# Patient Record
Sex: FEMALE | Race: BLACK OR AFRICAN AMERICAN | URBAN - METROPOLITAN AREA
[De-identification: names, ages, dates, MRNs, and addresses within clinical notes are randomized per-mention and may not be internally consistent; named-entity substitution may affect disease eponyms.]

---

## 2017-03-15 DIAGNOSIS — J45.40 MODERATE PERSISTENT ASTHMA WITHOUT COMPLICATION: ICD-10-CM

## 2017-03-15 RX ORDER — MONTELUKAST SODIUM 10 MG/1
TABLET ORAL
Qty: 90 TABLET | Refills: 0 | OUTPATIENT
Start: 2017-03-15

## 2017-04-14 DIAGNOSIS — J45.40 MODERATE PERSISTENT ASTHMA WITHOUT COMPLICATION: ICD-10-CM

## 2017-04-14 NOTE — TELEPHONE ENCOUNTER
tiotropium (SPIRIVA RESPIMAT) 2.5 MCG/ACT inhalation aerosol       Last Written Prescription Date: 11-  Last Fill Quantity: 4 g., # refills: 3    Last Office Visit with G, P or [facility] prescribing provider:  11-   Future Office Visit:       Date of Last Asthma Action Plan Letter:   Asthma Action Plan Q1 Year    Topic Date Due     Asthma Action Plan - yearly  09/15/2017      Asthma Control Test:   ACT Total Scores 11/22/2016   ACT TOTAL SCORE (Goal Greater than or Equal to 20) 14   In the past 12 months, how many times did you visit the emergency room for your asthma without being admitted to the hospital? 0   In the past 12 months, how many times were you hospitalized overnight because of your asthma? 0       Date of Last Spirometry Test:   No results found for this or any previous visit.

## 2017-04-14 NOTE — TELEPHONE ENCOUNTER
Routing refill request to provider for review/approval because:  ACT < [facility] protocol for RN fill  Carlie Perez RN  [facility] [employer]

## 2017-05-01 ENCOUNTER — TELEPHONE (OUTPATIENT)
Dept: FAMILY MEDICINE | Facility: CLINIC | Age: 42
End: 2017-05-01

## 2017-05-01 DIAGNOSIS — M10.00 IDIOPATHIC GOUT, UNSPECIFIED CHRONICITY, UNSPECIFIED SITE: Primary | ICD-10-CM

## 2017-05-01 NOTE — TELEPHONE ENCOUNTER
Pt insurance plan no longer covers Indomethacin. Would you like to proceed with PA or order something else?    Ph: [phone]  Pt id: [id number]    Sarah Severson, CMA

## 2017-05-02 ENCOUNTER — OFFICE VISIT (OUTPATIENT)
Dept: FAMILY MEDICINE | Facility: CLINIC | Age: 42
End: 2017-05-02
Payer: COMMERCIAL

## 2017-05-02 VITALS
DIASTOLIC BLOOD PRESSURE: 86 MMHG | BODY MASS INDEX: 50.86 KG/M2 | OXYGEN SATURATION: 98 % | SYSTOLIC BLOOD PRESSURE: 130 MMHG | WEIGHT: 269.4 LBS | TEMPERATURE: 98.2 F | HEART RATE: 110 BPM | HEIGHT: 61 IN

## 2017-05-02 DIAGNOSIS — J45.20 MILD INTERMITTENT ASTHMA WITHOUT COMPLICATION: ICD-10-CM

## 2017-05-02 DIAGNOSIS — M25.511 ACUTE PAIN OF RIGHT SHOULDER: Primary | ICD-10-CM

## 2017-05-02 PROCEDURE — 99214 OFFICE O/P EST MOD 30 MIN: CPT | Performed by: FAMILY MEDICINE

## 2017-05-02 RX ORDER — CYCLOBENZAPRINE HCL 5 MG
5 TABLET ORAL
Qty: 20 TABLET | Refills: 0 | Status: SHIPPED | OUTPATIENT
Start: 2017-05-02

## 2017-05-02 NOTE — TELEPHONE ENCOUNTER
Initiated a PA for Indomethacin. We should hear a response within 24-72 hours.  Lindy Kovacs, CMA

## 2017-05-02 NOTE — MR AVS SNAPSHOT
"              After Visit Summary   5/2/2017    Willow Gerard    MRN: [id number]           Patient Information     Date Of Birth          1975        Visit Information        Provider Department      5/2/2017 6:20 PM Natasha Medina MD [facility] [place]        Today's Diagnoses     Acute pain of right shoulder    -  1       Follow-ups after your visit        Follow-up notes from your care team     Return in about 7 months (around 11/20/2017) for Physical Exam.      Who to contact     If you have questions or need follow up information about today's clinic visit or your schedule please contact [facility] [place] directly at [phone].  Normal or non-critical lab and imaging results will be communicated to you by MyChart, letter or phone within 4 business days after the clinic has received the results. If you do not hear from us within 7 days, please contact the clinic through [employer] or phone. If you have a critical or abnormal lab result, we will notify you by phone as soon as possible.  Submit refill requests through [employer] or call your pharmacy and they will forward the refill request to us. Please allow 3 business days for your refill to be completed.          Additional Information About Your Visit        MyChart Information     [employer] gives you secure access to your electronic health record. If you see a primary care provider, you can also send messages to your care team and make appointments. If you have questions, please call your primary care clinic.  If you do not have a primary care provider, please call [phone] and they will assist you.        Care EveryWhere ID     This is your Care EveryWhere ID. This could be used by other organizations to access your [facility] medical records  [id number]        Your Vitals Were     Pulse Temperature Height Pulse Oximetry BMI (Body Mass Index)       110 98.2  F (36.8  C) (Tympanic) 5' 1\" (1.549 m) 98% 50.9 kg/m2        Blood " Pressure from Last 3 Encounters:   05/02/17 130/86   11/22/16 130/86   09/15/16 138/70    Weight from Last 3 Encounters:   05/02/17 269 lb 6.4 oz (122.2 kg)   11/22/16 267 lb (121.1 kg)   09/15/16 268 lb (121.6 kg)              Today, you had the following     No orders found for display         Today's Medication Changes          These changes are accurate as of: 5/2/17  6:43 PM.  If you have any questions, ask your nurse or doctor.               Start taking these medicines.        Dose/Directions    cyclobenzaprine 5 MG tablet   Commonly known as:  FLEXERIL   Used for:  Acute pain of right shoulder   Started by:  Natasha Medina MD        Dose:  5 mg   Take 1 tablet (5 mg) by mouth nightly as needed for muscle spasms   Quantity:  20 tablet   Refills:  0            Where to get your medicines      These medications were sent to [facility] [place] [address], [place] [address]     Phone:  [phone]     cyclobenzaprine 5 MG tablet                Primary Care Provider Office Phone # Fax #    Natasha Medina [phone]       [address]  [place] [address]        Thank you!     Thank you for [place] [facility]  [place] your [place]. Our goal is always to provide you with excellent care. Hearing back from our patients is one way we can continue to improve our services. Please take a few minutes to complete the written survey that you may receive in the mail after your visit with us. Thank you!             Your Updated Medication List - Protect others around you: Learn how to safely use, store and throw away your medicines at www.disposemymeds.org.          This list is accurate as of: 5/2/17  6:43 PM.  Always use your most recent med list.                   Brand Name Dispense Instructions for use    albuterol 108 (90 BASE) MCG/ACT Inhaler    albuterol    2 Inhaler     Inhale 2 puffs into the lungs every 4 hours as needed for shortness of breath / dyspnea or wheezing       Colchicine 0.6 MG Caps     60 capsule    Take 0.6 mg by mouth 2 times daily       cyclobenzaprine 5 MG tablet    FLEXERIL    20 tablet    Take 1 tablet (5 mg) by mouth nightly as needed for muscle spasms       ferrous sulfate 325 (65 FE) MG tablet    IRON    180 tablet    Take 1 tablet (325 mg) by mouth 2 times daily       fluticasone 50 MCG/ACT spray    FLONASE    16 g    Spray 2 sprays into both nostrils daily       * gabapentin 300 MG capsule    NEURONTIN    270 capsule    Take 1 capsule (300 mg) by mouth 3 times daily       * gabapentin 300 MG capsule    NEURONTIN    270 capsule    TAKE 1 CAPSULE(300 MG) BY MOUTH THREE TIMES DAILY       indomethacin 25 MG capsule    INDOCIN    60 capsule    TAKE 2 CAPSULES(50 MG) BY MOUTH TWICE DAILY AS NEEDED       lisinopril-hydrochlorothiazide 10-12.5 MG per tablet    PRINZIDE/ZESTORETIC    90 tablet    Take 1 tablet by mouth daily       mometasone-formoterol 200-5 MCG/ACT oral inhaler    DULERA    13 g    INHALE 2 PUFFS BY MOUTH TWICE DAILY       montelukast 10 MG tablet    SINGULAIR    90 tablet    Take 1 tablet (10 mg) by mouth At Bedtime       tiotropium 2.5 MCG/ACT inhalation aerosol    SPIRIVA RESPIMAT    4 g    Inhale 2 puffs into the lungs daily       * Notice:  This list has 2 medication(s) that are the same as other medications prescribed for you. Read the directions carefully, and ask your doctor or other care provider to review them with you.

## 2017-05-02 NOTE — PROGRESS NOTES
SUBJECTIVE:                                                    Willow Gerard is a 41 year old female who presents to clinic today for the following health issues:      Joint Pain     Onset: Friday    Description:   Location: right shoulder  Character: Dull ache and Stabbing    Intensity: mild, moderate    Progression of Symptoms: worse    Accompanying Signs & Symptoms:  Other symptoms: none   History:   Previous similar pain: no       Precipitating factors:   Trauma or overuse: no     Alleviating factors:  Improved by: heat       Therapies Tried and outcome: heat    Concern - disability parking form     ACT Total Scores 2017   ACT TOTAL SCORE (Goal Greater than or Equal to 20) 20   In the past 12 months, how many times did you visit the emergency room for your asthma without being admitted to the hospital? 0   In the past 12 months, how many times were you hospitalized overnight because of your asthma? 0         Problem list and histories reviewed & adjusted, as indicated.  Additional history: as documented    Patient Active Problem List   Diagnosis     SAM (obstructive sleep apnea)     Gout     Double vision     Hypertension     Asthma     Allergic rhinitis     Bursitis, trochanteric     Hydrocephalus     Menorrhagia     HTN, goal below 140/90     Encephalopathy     Past Surgical History:   Procedure Laterality Date     HERNIA REPAIR        IMPLANT SHUNT VENTRICULOPERITONEAL      prematurity        Social History   Substance Use Topics     Smoking status: Never Smoker     Smokeless tobacco: Never Used     Alcohol use No     Family History   Problem Relation Age of Onset     Hypertension       DIABETES       CEREBROVASCULAR DISEASE       Breast Cancer Paternal Grandfather      Arthritis       RA     CANCER Maternal Grandfather      stomach     CANCER Father      throat     KIDNEY DISEASE Sister      on dialysis         Current Outpatient Prescriptions   Medication Sig Dispense Refill      "cyclobenzaprine (FLEXERIL) 5 MG tablet Take 1 tablet (5 mg) by mouth nightly as needed for muscle spasms 20 tablet 0     tiotropium (SPIRIVA RESPIMAT) 2.5 MCG/ACT inhalation aerosol Inhale 2 puffs into the lungs daily 4 g 3     gabapentin (NEURONTIN) 300 MG capsule TAKE 1 CAPSULE(300 MG) BY MOUTH THREE TIMES DAILY 270 capsule 1     Colchicine 0.6 MG CAPS Take 0.6 mg by mouth 2 times daily 60 capsule 3     ferrous sulfate (IRON) 325 (65 FE) MG tablet Take 1 tablet (325 mg) by mouth 2 times daily 180 tablet 3     fluticasone (FLONASE) 50 MCG/ACT nasal spray Spray 2 sprays into both nostrils daily 16 g 11     montelukast (SINGULAIR) 10 MG tablet Take 1 tablet (10 mg) by mouth At Bedtime 90 tablet 3     albuterol (ALBUTEROL) 108 (90 BASE) MCG/ACT inhaler Inhale 2 puffs into the lungs every 4 hours as needed for shortness of breath / dyspnea or wheezing 2 Inhaler 6     gabapentin (NEURONTIN) 300 MG capsule Take 1 capsule (300 mg) by mouth 3 times daily 270 capsule 3     indomethacin (INDOCIN) 25 MG capsule TAKE 2 CAPSULES(50 MG) BY MOUTH TWICE DAILY AS NEEDED 60 capsule 7     lisinopril-hydrochlorothiazide (PRINZIDE,ZESTORETIC) 10-12.5 MG per tablet Take 1 tablet by mouth daily 90 tablet 3     mometasone-formoterol (DULERA) 200-5 MCG/ACT oral inhaler INHALE 2 PUFFS BY MOUTH TWICE DAILY 13 g 11     No Known Allergies    Reviewed and updated as needed this visit by clinical staff       Reviewed and updated as needed this visit by Provider         ROS:  C: NEGATIVE for fever, chills, change in weight  E/M: NEGATIVE for ear, mouth and throat problems  R: NEGATIVE for significant cough or SOB  CV: NEGATIVE for chest pain, palpitations or peripheral edema    OBJECTIVE:                                                    /86 (BP Location: Right arm, Cuff Size: Adult Large)  Pulse 110  Temp 98.2  F (36.8  C) (Tympanic)  Ht 5' 1\" (1.549 m)  Wt 269 lb 6.4 oz (122.2 kg)  SpO2 98%  BMI 50.9 kg/m2  Body mass index is 50.9 " kg/(m^2).   GENERAL: healthy, alert, well nourished, well hydrated, no distress  NECK: no tenderness, no adenopathy, no asymmetry, no masses, no stiffness; thyroid- normal to palpation  RESP: lungs clear to auscultation - no rales, no rhonchi, no wheezes  CV: regular rates and rhythm, normal S1 S2, no S3 or S4 and no murmur, no click or rub -  Right shoulder: tenderness noted posteriorly in the right shoulder area. No bony tenderness noted.   No swelling or erythema noted. ROM is limited due to pain       ASSESSMENT/PLAN:                                                      1. Acute pain of right shoulder  Recommended trial of muscle relaxer. Side effect profile discussed. Use ibuprofen prn pain during the day. If not improving or getting worse, recommended to notify me back.   - cyclobenzaprine (FLEXERIL) 5 MG tablet; Take 1 tablet (5 mg) by mouth nightly as needed for muscle spasms  Dispense: 20 tablet; Refill: 0    2. Mild intermittent asthma without complication  Well controlled.       Handicap parking permit form filled and given to the patient.   Follow up with Provider - as needed     Total time spent was 25 minutes, more than half the time was spent in counseling the patient about the disease pathogenesis, treatment plan and coordinating care.    Natasha Medina MD  [place]

## 2017-05-03 ASSESSMENT — ASTHMA QUESTIONNAIRES: ACT_TOTALSCORE: 20

## 2017-05-08 RX ORDER — NAPROXEN 500 MG/1
500 TABLET ORAL 2 TIMES DAILY PRN
Qty: 60 TABLET | Refills: 1 | Status: SHIPPED | OUTPATIENT
Start: 2017-05-08 | End: 2017-07-11

## 2017-05-08 NOTE — TELEPHONE ENCOUNTER
Spoke with patients niece (consent on file) and informed of below.   Francie Lockett RN   [facility] - Triage

## 2017-05-08 NOTE — TELEPHONE ENCOUNTER
PA was denied. This would be covered if she t/f Naproxen, Ibuprofen or Sulindac. I know she's on Gabapentin, so more than likely has tried Naproxen and ibuprofen but from a clinical standpoint it was not in her current or historical med list so could not prove it.   Lindy Kovacs, CMA

## 2017-05-08 NOTE — TELEPHONE ENCOUNTER
Indomethacin canceled. Try Naproxen instead. Ordered to pharmacy. Notify patient to use it for gout prn.     Natasha Medina MD  [facility], [place]

## 2017-05-11 DIAGNOSIS — G89.29 CHRONIC BILATERAL LOW BACK PAIN WITHOUT SCIATICA: ICD-10-CM

## 2017-05-11 DIAGNOSIS — M54.50 CHRONIC BILATERAL LOW BACK PAIN WITHOUT SCIATICA: ICD-10-CM

## 2017-05-11 RX ORDER — GABAPENTIN 300 MG/1
CAPSULE ORAL
Qty: 270 CAPSULE | Refills: 1 | Status: SHIPPED | OUTPATIENT
Start: 2017-05-11 | End: 2017-11-08

## 2017-05-11 NOTE — TELEPHONE ENCOUNTER
gabapentin (NEURONTIN) 300 MG capsule      Last Written Prescription Date:  12/13/2016  Last Fill Quantity: 270,   # refills: 1  Last Office Visit with [facility], [facility] or  Health prescribing provider: 5/2/2017  Future Office visit:       Routing refill request to provider for review/approval because:  Drug not on the [facility], [facility] or [facility] refill protocol or controlled substance

## 2017-07-11 DIAGNOSIS — M10.00 IDIOPATHIC GOUT, UNSPECIFIED CHRONICITY, UNSPECIFIED SITE: ICD-10-CM

## 2017-07-11 NOTE — TELEPHONE ENCOUNTER
naproxen (NAPROSYN) 500 MG      Last Written Prescription Date: 5/8/17  Last Quantity: 60, # refills: 1  Last Office Visit with G, P or [facility] prescribing provider: 5/2/17       Creatinine   Date Value Ref Range Status   03/17/2016 0.60 0.52 - 1.04 mg/dL Final     Lab Results   Component Value Date    AST 9 03/17/2016     Lab Results   Component Value Date    ALT 23 03/17/2016     BP Readings from Last 3 Encounters:   05/02/17 130/86   11/22/16 130/86   09/15/16 138/70

## 2017-07-11 NOTE — TELEPHONE ENCOUNTER
Routing refill request to provider for review/approval because:  Labs not current:  Creatinine, ALT/AST annual needed.  Nohelia Nye RN

## 2017-07-12 RX ORDER — NAPROXEN 500 MG/1
500 TABLET ORAL 2 TIMES DAILY PRN
Qty: 60 TABLET | Refills: 1 | Status: SHIPPED | OUTPATIENT
Start: 2017-07-12 | End: 2017-09-11

## 2017-07-24 DIAGNOSIS — I10 HTN, GOAL BELOW 140/90: ICD-10-CM

## 2017-07-24 NOTE — TELEPHONE ENCOUNTER
Prinzide       Last Written Prescription Date: 9/15/16  Last Fill Quantity: 90, # refills: 3  Last Office Visit with [facility], [facility] or [facility] prescribing provider: 5/2/17       Potassium   Date Value Ref Range Status   03/17/2016 3.6 3.4 - 5.3 mmol/L Final     Creatinine   Date Value Ref Range Status   03/17/2016 0.60 0.52 - 1.04 mg/dL Final     BP Readings from Last 3 Encounters:   05/02/17 130/86   11/22/16 130/86   09/15/16 138/70

## 2017-07-25 RX ORDER — LISINOPRIL/HYDROCHLOROTHIAZIDE 10-12.5 MG
TABLET ORAL
Qty: 90 TABLET | Refills: 0 | OUTPATIENT
Start: 2017-07-25

## 2017-08-07 DIAGNOSIS — J45.40 MODERATE PERSISTENT ASTHMA WITHOUT COMPLICATION: ICD-10-CM

## 2017-08-07 NOTE — TELEPHONE ENCOUNTER
tiotropium (SPIRIVA RESPIMAT) 2.5 MCG/ACT inhalation aerosol      Last Written Prescription Date: 4-  Last Fill Quantity: 4 g., # refills: 3    Last Office Visit with G, P or [facility] prescribing provider:  5-2-2017   Future Office Visit:       Date of Last Asthma Action Plan Letter:   Asthma Action Plan Q1 Year    Topic Date Due     Asthma Action Plan - yearly  09/15/2017      Asthma Control Test:   ACT Total Scores 5/2/2017   ACT TOTAL SCORE -   ASTHMA ER VISITS -   ASTHMA HOSPITALIZATIONS -   ACT TOTAL SCORE (Goal Greater than or Equal to 20) 20   In the past 12 months, how many times did you visit the emergency room for your asthma without being admitted to the hospital? 0   In the past 12 months, how many times were you hospitalized overnight because of your asthma? 0       Date of Last Spirometry Test:   No results found for this or any previous visit.

## 2017-08-07 NOTE — TELEPHONE ENCOUNTER
appt and ACT up to date and at goal.  Refill request approved per [facility] protocol    Angie Gates RN

## 2017-09-06 DIAGNOSIS — J30.9 ALLERGIC RHINITIS: Primary | ICD-10-CM

## 2017-09-06 RX ORDER — FLUTICASONE PROPIONATE 50 MCG
2 SPRAY, SUSPENSION (ML) NASAL DAILY
Qty: 16 G | Refills: 4 | Status: SHIPPED | OUTPATIENT
Start: 2017-09-06

## 2017-09-06 NOTE — TELEPHONE ENCOUNTER
fluticasone (FLONASE) 50 MCG/ACT      Last Written Prescription Date: 9/15/16  Last Fill Quantity: 16g,  # refills: 11   Last Office Visit with [facility], [facility] or [facility] prescribing provider: 5/2/17

## 2017-09-06 NOTE — TELEPHONE ENCOUNTER
Prescription approved per [facility], [facility] or [facility] refill protocol.  Carlie Perez RN - Triage  [facility]

## 2017-09-11 DIAGNOSIS — D50.8 OTHER IRON DEFICIENCY ANEMIA: ICD-10-CM

## 2017-09-11 DIAGNOSIS — M10.00 IDIOPATHIC GOUT, UNSPECIFIED CHRONICITY, UNSPECIFIED SITE: ICD-10-CM

## 2017-09-11 NOTE — TELEPHONE ENCOUNTER
naproxen (NAPROSYN) 500 MG tablet      Last Written Prescription Date: 7-  Last Quantity: 60, # refills: 1  Last Office Visit with [facility], [facility] or  [facility] prescribing provider: 5-2-2017       Creatinine   Date Value Ref Range Status   03/17/2016 0.60 0.52 - 1.04 mg/dL Final     Lab Results   Component Value Date    AST 9 03/17/2016     Lab Results   Component Value Date    ALT 23 03/17/2016     BP Readings from Last 3 Encounters:   05/02/17 130/86   11/22/16 130/86   09/15/16 138/70   Last Office Visit with [facility], [facility] or [employer] prescribing provider: 9-                                             Last Written Prescription Date: 180  Last Fill Quantity: 180,  # refills: 3   Last Office Visit with [facility], [facility] or [employer] prescribing provider: 5-2-2017

## 2017-09-13 RX ORDER — FERROUS SULFATE 325(65) MG
325 TABLET ORAL 2 TIMES DAILY
Qty: 180 TABLET | Refills: 3 | Status: SHIPPED | OUTPATIENT
Start: 2017-09-13

## 2017-09-13 RX ORDER — NAPROXEN 500 MG/1
500 TABLET ORAL 2 TIMES DAILY PRN
Qty: 60 TABLET | Refills: 1 | Status: SHIPPED | OUTPATIENT
Start: 2017-09-13 | End: 2017-11-09

## 2017-09-13 NOTE — TELEPHONE ENCOUNTER
Ferrous sulfate 325        Last Written Prescription Date: 9/15/16  Last Fill Quantity: 180,    # refills: 3  Last Office Visit with [facility], [facility] or [facility] prescribing provider:  5/2/17      Lab Results   Component Value Date    WBC 5.8 03/17/2016     Lab Results   Component Value Date    RBC 4.79 03/17/2016     Lab Results   Component Value Date    HGB 12.9 03/17/2016     Lab Results   Component Value Date    HCT 40.3 03/17/2016     No components found for: MCT  Lab Results   Component Value Date    MCV 84 03/17/2016     Lab Results   Component Value Date    MCH 26.9 03/17/2016     Lab Results   Component Value Date    MCHC 32.0 03/17/2016     Lab Results   Component Value Date    RDW 15.0 03/17/2016     Lab Results   Component Value Date     03/17/2016     Lab Results   Component Value Date    AST 9 03/17/2016     Lab Results   Component Value Date    ALT 23 03/17/2016     Creatinine   Date Value Ref Range Status   03/17/2016 0.60 0.52 - 1.04 mg/dL Final     Routing refill request to provider for review/approval because:  Labs not current:  Over a year since related labs.  Patient is due for asthma follow up in November.  Nohelia Nye RN

## 2017-09-20 DIAGNOSIS — I10 ESSENTIAL HYPERTENSION WITH GOAL BLOOD PRESSURE LESS THAN 140/90: ICD-10-CM

## 2017-09-20 RX ORDER — LISINOPRIL/HYDROCHLOROTHIAZIDE 10-12.5 MG
1 TABLET ORAL DAILY
Qty: 90 TABLET | Refills: 0 | Status: SHIPPED | OUTPATIENT
Start: 2017-09-20 | End: 2018-03-06

## 2017-09-20 NOTE — TELEPHONE ENCOUNTER
Please have the patient come in for annual exam, labs and recheck.   RF ordered    Natasha Medina MD  [facility], [place]

## 2017-09-20 NOTE — TELEPHONE ENCOUNTER
Routing refill request to provider for review/approval because:  Labs not current:  Potassium, creatinine  Carlie Perez RN - Triage  [facility]

## 2017-09-20 NOTE — TELEPHONE ENCOUNTER
lisinopril-hydrochlorothiazide (PRINZIDE,ZESTORETIC) 10-12.5 MG      Last Written Prescription Date: 9/15/16  Last Fill Quantity: 90, # refills: 3  Last Office Visit with G, P or [facility] prescribing provider: 5/2/17       Potassium   Date Value Ref Range Status   03/17/2016 3.6 3.4 - 5.3 mmol/L Final     Creatinine   Date Value Ref Range Status   03/17/2016 0.60 0.52 - 1.04 mg/dL Final     BP Readings from Last 3 Encounters:   05/02/17 130/86   11/22/16 130/86   09/15/16 138/70

## 2017-10-09 DIAGNOSIS — J45.40 MODERATE PERSISTENT ASTHMA WITHOUT COMPLICATION: ICD-10-CM

## 2017-10-09 NOTE — TELEPHONE ENCOUNTER
montelukast (SINGULAIR) 10 MG tablet       Last Written Prescription Date: 9-  Last Fill Quantity: 90, # refills: 3    Last Office Visit with [facility], [facility] or  Health prescribing provider:  5-2-2017   Future Office Visit:       Date of Last Asthma Action Plan Letter:   Asthma Action Plan Q1 Year    Topic Date Due     Asthma Action Plan - yearly  09/15/2017      Asthma Control Test:   ACT Total Scores 5/2/2017   ACT TOTAL SCORE -   ASTHMA ER VISITS -   ASTHMA HOSPITALIZATIONS -   ACT TOTAL SCORE (Goal Greater than or Equal to 20) 20   In the past 12 months, how many times did you visit the emergency room for your asthma without being admitted to the hospital? 0   In the past 12 months, how many times were you hospitalized overnight because of your asthma? 0       Date of Last Spirometry Test:   No results found for this or any previous visit.   mometasone-formoterol (DULERA) 200-5 MCG/ACT oral inhaler      Last Written Prescription Date: 9-  Last Fill Quantity: 13 g., # refills: 11    Last Office Visit with [facility], [facility] or  Health prescribing provider:  5-2-2017   Future Office Visit:       Date of Last Asthma Action Plan Letter:   Asthma Action Plan Q1 Year    Topic Date Due     Asthma Action Plan - yearly  09/15/2017      Asthma Control Test:   ACT Total Scores 5/2/2017   ACT TOTAL SCORE -   ASTHMA ER VISITS -   ASTHMA HOSPITALIZATIONS -   ACT TOTAL SCORE (Goal Greater than or Equal to 20) 20   In the past 12 months, how many times did you visit the emergency room for your asthma without being admitted to the hospital? 0   In the past 12 months, how many times were you hospitalized overnight because of your asthma? 0       Date of Last Spirometry Test:   No results found for this or any previous visit.

## 2017-10-10 RX ORDER — MONTELUKAST SODIUM 10 MG/1
10 TABLET ORAL AT BEDTIME
Qty: 90 TABLET | Refills: 1 | Status: SHIPPED | OUTPATIENT
Start: 2017-10-10 | End: 2018-04-23

## 2017-10-10 NOTE — TELEPHONE ENCOUNTER
Prescription approved per [facility], [facility] or [facility] refill protocol.  Carlie Perez RN - Triage  [facility]

## 2017-11-08 DIAGNOSIS — G89.29 CHRONIC BILATERAL LOW BACK PAIN WITHOUT SCIATICA: ICD-10-CM

## 2017-11-08 DIAGNOSIS — M54.50 CHRONIC BILATERAL LOW BACK PAIN WITHOUT SCIATICA: ICD-10-CM

## 2017-11-08 RX ORDER — GABAPENTIN 300 MG/1
CAPSULE ORAL
Qty: 270 CAPSULE | Refills: 0 | Status: SHIPPED | OUTPATIENT
Start: 2017-11-08 | End: 2018-02-14

## 2017-11-08 NOTE — TELEPHONE ENCOUNTER
Routing to team to inform and assist in scheduling.   Francie Lockett RN   [facility] - Triage

## 2017-11-08 NOTE — TELEPHONE ENCOUNTER
Gabapentin      Last Written Prescription Date:  5/11/17  Last Fill Quantity: 270,   # refills: 1  Future Office visit:       Routing refill request to provider for review/approval because:  Drug not on the [facility], [facility] or [facility] refill protocol or controlled substance    Chikis Morton CMA

## 2017-11-08 NOTE — TELEPHONE ENCOUNTER
RF ordered. Patient is due for annual and pap in 10 days. Have her come in please.     Natasha Medina MD  [facility], [place]

## 2017-11-09 DIAGNOSIS — M10.00 IDIOPATHIC GOUT, UNSPECIFIED CHRONICITY, UNSPECIFIED SITE: ICD-10-CM

## 2017-11-09 NOTE — TELEPHONE ENCOUNTER
Routing refill request to provider for review/approval because:  Labs not current:  AST, ALT, creatinine, and CBC    Angie Gates RN

## 2017-11-10 NOTE — TELEPHONE ENCOUNTER
Pt is no longer seen at this clinic will remove PCP and asked pt to let pharmacy know not to send scripts to this clinic anymore.    Chikis Morton CMA

## 2017-11-12 NOTE — TELEPHONE ENCOUNTER
Patient needs to come in for OV visit to review the frequent use of naproxen and get labs.   For now I am lowering the dose of naproxen from 500 to 375 mg BID prn.    Natasha Medina MD  [facility], [place]

## 2017-11-13 NOTE — TELEPHONE ENCOUNTER
Routing to team to inform and assist in scheduling.   Francie Lockett RN   [facility] - Triage

## 2017-12-06 DIAGNOSIS — J45.40 MODERATE PERSISTENT ASTHMA WITHOUT COMPLICATION: ICD-10-CM

## 2017-12-06 NOTE — TELEPHONE ENCOUNTER
Requested Prescriptions   Pending Prescriptions Disp Refills     tiotropium (SPIRIVA RESPIMAT) 2.5 MCG/ACT inhalation aerosol  Last Written Prescription Date:  8/7/2017  Last Fill Quantity: 4 g,  # refills: 3   Last Office Visit with [facility] or [facility] prescribing provider:  5/2/2017   Future Office Visit:      4 g 3     Sig: Inhale 2 puffs into the lungs daily    Asthma Maintenance Inhalers - Anticholinergics Failed    12/6/2017  8:19 AM       Failed - Asthma control test score is 20 or greater in last 6 months    Please review ACT score.   ACT Total Scores 9/15/2016 11/22/2016 5/2/2017   ACT TOTAL SCORE - - -   ASTHMA ER VISITS - - -   ASTHMA HOSPITALIZATIONS - - -   ACT TOTAL SCORE (Goal Greater than or Equal to 20) 15 14 20   In the past 12 months, how many times did you visit the emergency room for your asthma without being admitted to the hospital? 0 0 0   In the past 12 months, how many times were you hospitalized overnight because of your asthma? 0 0 0            Failed - Recent (6 mo) or future visit with authorizing provider's specialty    Patient had office visit in the last 6 months or has a visit in the next 30 days with authorizing provider.  See chart review.            Passed - Patient is age 12 years or older

## 2017-12-06 NOTE — TELEPHONE ENCOUNTER
tiotropium (SPIRIVA RESPIMAT) 2.5 MCG/ACT      Last Written Prescription Date: 8/7/17  Last Fill Quantity: 4g,  # refills: 3   Last Office Visit with [facility], [facility] or [facility] prescribing provider: 5/2/17

## 2017-12-06 NOTE — TELEPHONE ENCOUNTER
Medication is being filled for 1 time refill only due to:  Patient needs to be seen because due for asthma follow up appointment..   Nohelia Nye RN

## 2018-01-12 DIAGNOSIS — J45.40 MODERATE PERSISTENT ASTHMA WITHOUT COMPLICATION: ICD-10-CM

## 2018-01-12 NOTE — TELEPHONE ENCOUNTER
"ACT Total Scores 9/15/2016 11/22/2016 5/2/2017   ACT TOTAL SCORE - - -   ASTHMA ER VISITS - - -   ASTHMA HOSPITALIZATIONS - - -   ACT TOTAL SCORE (Goal Greater than or Equal to 20) 15 14 20   In the past 12 months, how many times did you visit the emergency room for your asthma without being admitted to the hospital? 0 0 0   In the past 12 months, how many times were you hospitalized overnight because of your asthma? 0 0 0     Requested Prescriptions   Pending Prescriptions Disp Refills     tiotropium (SPIRIVA RESPIMAT) 2.5 MCG/ACT inhalation aerosol 4 g 0     Sig: Inhale 2 puffs into the lungs daily    Asthma Maintenance Inhalers - Anticholinergics Failed    1/12/2018  9:11 AM       Failed - Asthma control test score is 20 or greater in last 6 months    Please review ACT score.          Failed - Recent (6 mo) or future visit with authorizing provider's specialty    Patient had office visit in the last 6 months or has a visit in the next 30 days with authorizing provider.  See \"Patient Info\" tab in inbasket, or \"Choose Columns\" in Meds & Orders section of the refill encounter.          Passed - Patient is age 12 years or older        Last Written Prescription Date:  12/6/2017  Last Fill Quantity: 4 g   # refills: 0  Last Office Visit with [facility] or [facility] prescribing provider:  5/5/2017  Future Office Visit:       "

## 2018-01-21 ENCOUNTER — HEALTH MAINTENANCE LETTER (OUTPATIENT)
Age: 43
End: 2018-01-21

## 2018-02-14 DIAGNOSIS — G89.29 CHRONIC BILATERAL LOW BACK PAIN WITHOUT SCIATICA: ICD-10-CM

## 2018-02-14 DIAGNOSIS — M54.50 CHRONIC BILATERAL LOW BACK PAIN WITHOUT SCIATICA: ICD-10-CM

## 2018-02-14 RX ORDER — GABAPENTIN 300 MG/1
CAPSULE ORAL
Qty: 270 CAPSULE | Refills: 0 | Status: SHIPPED | OUTPATIENT
Start: 2018-02-14 | End: 2018-05-21

## 2018-02-14 NOTE — TELEPHONE ENCOUNTER
Requested Prescriptions   Pending Prescriptions Disp Refills     gabapentin (NEURONTIN) 300 MG capsule [Pharmacy Med Name: GABAPENTIN 300MG CAPSULES] 270 capsule 0     Sig: TAKE 1 CAPSULE(300 MG) BY MOUTH THREE TIMES DAILY    There is no refill protocol information for this order        Last Written Prescription Date:  11/8/17  Last Fill Quantity: 270,  # refills: 0   Last office visit: 5/2/2017 with prescribing provider:  5/2/17   Future Office Visit:

## 2018-02-16 ENCOUNTER — TELEPHONE (OUTPATIENT)
Dept: FAMILY MEDICINE | Facility: CLINIC | Age: 43
End: 2018-02-16

## 2018-02-16 NOTE — TELEPHONE ENCOUNTER
Prior Authorization Retail Medication Request  Medication/Dose: tiotropium (SPIRIVA RESPIMAT) 2.5 MCG/ACT inhalation aerosol  Diagnosis and ICD code: Moderate persistent asthma without complication [J45.40]   New/Renewal/Insurance Change PA:   Previously Tried and Failed Therapies:     Insurance ID (if provided): [id number]  Insurance Phone (if provided): [phone]    Any additional info from fax request:     If you received a fax notification from an outside Pharmacy:  Pharmacy Name:[facility]   Pharmacy #:[phone]  Pharmacy Fax:[phone]

## 2018-02-16 NOTE — TELEPHONE ENCOUNTER
Plan does not cover this medication. Please call the plan at [phone]  to initiate prior authorization or call/fax to pharmacy change in medication. Patient ID is [id number]      Adela CASTORENA

## 2018-02-16 NOTE — LETTER
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    Patient has a diagnosis of asthma which has not been well controlled until recently.  She is currently on Dulera, Singulair and Spiriva.    Coverage for Spiriva was declined.  Prior authorization also was declined.  I am writing this letter to re-appeal to get coverage for Spiriva.    The formulary alternatives given to me to use instead of Spiriva were all inhaled steroids, which the patient is already on and are not appropriate in this case. I would like the patient to continue inhaled anticholinergic agent -Spiriva at this time to continue to maintain good control of asthma.    I would request approving coverage for Spiriva, without which her symptoms of asthma may worsen again.       If you have any other questions or concerns please feel free to contact me at anytime.        Sincerely,      Adam Kaur M.D.

## 2018-02-19 NOTE — TELEPHONE ENCOUNTER
PA Initiation    Medication: SPIRIVA RESPIMAT 2.5 MCG/ACT  Insurance Company: [employer] - Phone [phone] Fax [phone]  Pharmacy Filling the Rx: [id number] [place] - [address] AT [facility] [address]  Filling Pharmacy Phone: [phone]  Filling Pharmacy Fax:    Start Date: 2/19/2018

## 2018-02-19 NOTE — TELEPHONE ENCOUNTER
Request has been submitted via [facility]. Waiting for questions to generate before completing PA.

## 2018-02-20 NOTE — TELEPHONE ENCOUNTER
PRIOR AUTHORIZATION DENIED    Medication: SPIRIVA RESPIMAT 2.5 MCG/ACT - DENIED    Denial Date: 2/20/2018    Denial Rational:     Appeal Information:

## 2018-02-21 NOTE — TELEPHONE ENCOUNTER
PA has been denied, formulary alternatives are Flovent Diskus, Amuity Ellipta, Aerospan, Flovent HFA.

## 2018-02-21 NOTE — TELEPHONE ENCOUNTER
Letter done.  Please print and send it back    Natasha Medina MD  [facility] [place]

## 2018-02-21 NOTE — TELEPHONE ENCOUNTER
Patient is already on inhaled steroid - DULERA.  The alternative options they are offering are all inhaled steroids as well.    Spiriva is inhaled anticholinergic patient.    Therefore opting for 1 of the alternative options given,  is not a choice as the patient is already on a steroid, which alone does not control her symptoms.    We need to re-appeal.  Let me know if I need to put this in a letter or another form.    Natasha Medina MD  [facility]

## 2018-02-21 NOTE — TELEPHONE ENCOUNTER
Dr. Medina please put in letter form and then we will route back to ANJALI pérez.    Chikis Morton CMA

## 2018-02-22 NOTE — TELEPHONE ENCOUNTER
Medication Appeal Initiation    We have initiated an appeal for the requested medication:  Medication: SPIRIVA RESPIMAT 2.5 MCG/ACT - DENIED  Appeal Start Date:  2/22/2018  Insurance Company: [employer] - Phone [phone] Fax [phone]  Comments:  Appeal has been faxed to insurance along with appeal letter.

## 2018-02-27 NOTE — TELEPHONE ENCOUNTER
Results of the appeal review:  We dfound that Spiriva is covered under your benefit plan. Coverage is approved for the following time frame:  2/26/18-2/26/19

## 2018-03-01 DIAGNOSIS — M10.00 IDIOPATHIC GOUT, UNSPECIFIED CHRONICITY, UNSPECIFIED SITE: Primary | ICD-10-CM

## 2018-03-01 NOTE — LETTER
March 5, 2018      Willow Gerard  [address] [address]        Dear Willow,     Your indomethacin (INDOCIN) 25 MG capsule was refilled for 30 days  You are due to be seen in clinic by your Provider prior to your next refill for an  annual exam and labs  Please come in Fasting to your appointment. Please do not eat 8-10 hours before your appointment . . Water and Black coffee is ok but do not add cream or sugar. You may take your medications.  Please contact the clinic and allow enough time to schedule prior to your next refill need.  [phone]        Adela  for :  Dr. Megan Medina

## 2018-03-02 RX ORDER — INDOMETHACIN 25 MG/1
25 CAPSULE ORAL 2 TIMES DAILY PRN
Qty: 30 CAPSULE | Refills: 0 | Status: SHIPPED | OUTPATIENT
Start: 2018-03-02

## 2018-03-02 NOTE — TELEPHONE ENCOUNTER
Routing refill request to provider for review/approval because:  Drug not active on patient's medication list    Angie Gates RN

## 2018-03-02 NOTE — TELEPHONE ENCOUNTER
Patient is over due for annual exam and labs. RF ordered    Natasha Medina MD  [facility] [place]

## 2018-03-02 NOTE — TELEPHONE ENCOUNTER
"Requested Prescriptions   Pending Prescriptions Disp Refills     indomethacin (INDOCIN) 25 MG capsule 42 capsule 1    Last Written Prescription Date:  07/28/2016  Last Fill Quantity: 60 capsule,  # refills: 1 \"History Med\"  Last office visit: 5/2/2017 with prescribing provider:  05/02/2017 - Natasha Medina   Future Office Visit:     Sig: Take 1 capsule (25 mg) by mouth 2 times daily (with meals)    Gout Agents Protocol Failed    3/1/2018  4:06 PM       Failed - CBC on file in past 12 months    Recent Labs   Lab Test  03/17/16   1126   WBC  5.8   RBC  4.79   HGB  12.9   HCT  40.3   PLT  375            Failed - ALT on file in past 12 months    Recent Labs   Lab Test  03/17/16   1126   ALT  23            Failed - Uric acid greater than or equal to 6 on file in past 12 months    No lab results found.  If level is 6mg/dL or greater, ok to refill one time and refer to provider.          Failed - Normal serum creatinine on file in the past 12 months    Recent Labs   Lab Test  03/17/16   1126   CR  0.60            Passed - Recent or future visit with authorizing provider's specialty    Patient had office visit in the last year or has a visit in the next 30 days with authorizing provider.  See \"Patient Info\" tab in inbasket, or \"Choose Columns\" in Meds & Orders section of the refill encounter.            Passed - Patient is age 18 or older       Passed - No active pregnancy on record       Passed - No positive pregnancy test in past year          "

## 2018-03-06 DIAGNOSIS — I10 ESSENTIAL HYPERTENSION WITH GOAL BLOOD PRESSURE LESS THAN 140/90: ICD-10-CM

## 2018-03-06 NOTE — LETTER
March 13, 2018      Willow Gerard  [address] [address]        Dear Willow,     Your lisinopril-hydrochlorothiazide (PRINZIDE/ZESTORETIC) 10-12.5 MG per tablet was refilled for 30 days   You are due to be seen in clinic by your Provider prior to your next refill for an annual exam and labs.   Please come in Fasting to your appointment. Please do not eat 8-10 hours before your appointment . . Water and Black coffee is ok but do not add cream or sugar. You may take your medications.   Please contact the clinic and allow enough time to schedule prior to your next refill need.  [phone]       * If you are no longer a [facility] patient; please let us know that as well.    Adela  for :   Dr. Megan Medina

## 2018-03-07 RX ORDER — LISINOPRIL/HYDROCHLOROTHIAZIDE 10-12.5 MG
1 TABLET ORAL DAILY
Qty: 30 TABLET | Refills: 0 | Status: SHIPPED | OUTPATIENT
Start: 2018-03-07

## 2018-03-07 NOTE — TELEPHONE ENCOUNTER
"Requested Prescriptions   Pending Prescriptions Disp Refills     lisinopril-hydrochlorothiazide (PRINZIDE/ZESTORETIC) 10-12.5 MG per tablet  Last Written Prescription Date:  9/20/17  Last Fill Quantity: 90,  # refills: 0   Last office visit: 5/2/2017 with prescribing provider:  5/2/17   Future Office Visit:     90 tablet 0     Sig: Take 1 tablet by mouth daily    Diuretics (Including Combos) Protocol Failed    3/6/2018  8:39 AM       Failed - Normal serum creatinine on file in past 12 months    Recent Labs   Lab Test  03/17/16   1126   CR  0.60             Failed - Normal serum potassium on file in past 12 months    Recent Labs   Lab Test  03/17/16   1126   POTASSIUM  3.6                   Failed - Normal serum sodium on file in past 12 months    Recent Labs   Lab Test  03/17/16   1126   NA  139             Passed - Blood pressure under 140/90 in past 12 months    BP Readings from Last 3 Encounters:   05/02/17 130/86   11/22/16 130/86   09/15/16 138/70                Passed - Recent (12 mo) or future (30 days) visit within the authorizing provider's specialty    Patient had office visit in the last year or has a visit in the next 30 days with authorizing provider.  See \"Patient Info\" tab in inbasket, or \"Choose Columns\" in Meds & Orders section of the refill encounter.            Passed - Patient is age 18 or older       Passed - No active pregancy on record       Passed - No positive pregnancy test in past 12 months          "

## 2018-03-08 NOTE — TELEPHONE ENCOUNTER
Routing refill request to provider for review/approval because:  Labs not current:  Creatinine ,potassium and sodium      TARA Marr, RN, N  [facility]) [phone]

## 2018-03-08 NOTE — TELEPHONE ENCOUNTER
30 day supply ordered. Patient needs to come in for annual exam and labs.    Natasha Medina MD  [facility], [place]

## 2018-03-13 NOTE — TELEPHONE ENCOUNTER
mychart not reviewed, only number is work number listed  Mailed letter to patient  Adela CASTORENA

## 2018-04-13 DIAGNOSIS — I10 ESSENTIAL HYPERTENSION WITH GOAL BLOOD PRESSURE LESS THAN 140/90: ICD-10-CM

## 2018-04-13 RX ORDER — LISINOPRIL/HYDROCHLOROTHIAZIDE 10-12.5 MG
TABLET ORAL
Qty: 30 TABLET | Refills: 0 | OUTPATIENT
Start: 2018-04-13

## 2018-04-13 NOTE — TELEPHONE ENCOUNTER
Rx denied. Interim refill already given and no office visit scheduled.  Please call patient to schedule.    TARA Marr, RN, N  [facility]) [phone]

## 2018-04-13 NOTE — TELEPHONE ENCOUNTER
"Requested Prescriptions   Pending Prescriptions Disp Refills     lisinopril-hydrochlorothiazide (PRINZIDE/ZESTORETIC) 10-12.5 MG per tablet [Pharmacy Med Name: LISINOPRIL-HCTZ 10/12.5MG TABLETS]  Last Written Prescription Date:  3/7/18  Last Fill Quantity: 30,  # refills: 0   Last office visit: 5/2/2017 with prescribing provider:  daily   Future Office Visit:     30 tablet 0     Sig: TAKE 1 TABLET BY MOUTH DAILY    Diuretics (Including Combos) Protocol Failed    4/13/2018  3:42 AM       Failed - Normal serum creatinine on file in past 12 months    Recent Labs   Lab Test  03/17/16   1126   CR  0.60             Failed - Normal serum potassium on file in past 12 months    Recent Labs   Lab Test  03/17/16   1126   POTASSIUM  3.6                   Failed - Normal serum sodium on file in past 12 months    Recent Labs   Lab Test  03/17/16   1126   NA  139             Passed - Blood pressure under 140/90 in past 12 months    BP Readings from Last 3 Encounters:   05/02/17 130/86   11/22/16 130/86   09/15/16 138/70                Passed - Recent (12 mo) or future (30 days) visit within the authorizing provider's specialty    Patient had office visit in the last 12 months or has a visit in the next 30 days with authorizing provider or within the authorizing provider's specialty.  See \"Patient Info\" tab in inbasket, or \"Choose Columns\" in Meds & Orders section of the refill encounter.           Passed - Patient is age 18 or older       Passed - No active pregancy on record       Passed - No positive pregnancy test in past 12 months          "

## 2018-04-13 NOTE — LETTER
April 19, 2018      Willow Gerard  [address] [address]            Magalie Gerard,    Your provider Natasha Medina MD has requested that you please schedule an appointment to receive additional refills on the medication lisinopril-hydrochlorothiazide . There will not be any refills given until you are seen by a provider.    If you have any further questions or would like to schedule an appointment, please contact our office at [phone].    Thank you,    [facility] [place]

## 2018-04-19 NOTE — TELEPHONE ENCOUNTER
[employer] messages not read  Only number listed is work  TC mailed letter  Adela CASTORENA

## 2018-04-23 DIAGNOSIS — J45.40 MODERATE PERSISTENT ASTHMA WITHOUT COMPLICATION: ICD-10-CM

## 2018-04-23 NOTE — TELEPHONE ENCOUNTER
"Requested Prescriptions   Pending Prescriptions Disp Refills     tiotropium (SPIRIVA RESPIMAT) 2.5 MCG/ACT inhalation aerosol  Last Written Prescription Date: 1-  Last Fill Quantity: 4 g,  # refills: 5   Last office visit: 5/2/2017 with prescribing provider:  5-2-2017   Future Office Visit:     4 g 5     Sig: Inhale 2 puffs into the lungs daily    Asthma Maintenance Inhalers - Anticholinergics Failed    4/23/2018  8:42 AM       Failed - Asthma control assessment score within normal limits in last 6 months    Please review ACT score.          Failed - Recent (6 mo) or future (30 days) visit within the authorizing provider's specialty    Patient had office visit in the last 6 months or has a visit in the next 30 days with authorizing provider or within the authorizing provider's specialty.  See \"Patient Info\" tab in inbasket, or \"Choose Columns\" in Meds & Orders section of the refill encounter.           Passed - Patient is age 12 years or older                montelukast (SINGULAIR) 10 MG tablet  Last Written Prescription Date:  10-  Last Fill Quantity: 90 tblet,  # refills: 1   Last office visit: 5/2/2017 with prescribing provider:  5-2-2017  Future Office Visit:     90 tablet 1     Sig: Take 1 tablet (10 mg) by mouth At Bedtime    Leukotriene Inhibitors Protocol Failed    4/23/2018  8:42 AM       Failed - Asthma control assessment score within normal limits in last 6 months    Please review ACT score.          Failed - Recent (6 mo) or future (30 days) visit within the authorizing provider's specialty    Patient had office visit in the last 6 months or has a visit in the next 30 days with authorizing provider or within the authorizing provider's specialty.  See \"Patient Info\" tab in inbasket, or \"Choose Columns\" in Meds & Orders section of the refill encounter.           Passed - Patient is age 12 or older    If patient is under 16, ok to refill using age based dosing.             "

## 2018-04-24 RX ORDER — MONTELUKAST SODIUM 10 MG/1
10 TABLET ORAL AT BEDTIME
Qty: 90 TABLET | Refills: 1 | Status: SHIPPED | OUTPATIENT
Start: 2018-04-24 | End: 2018-10-22

## 2018-04-24 NOTE — TELEPHONE ENCOUNTER
Spiriva has refills remaining.  Carlie Perez RN - Triage  [facility]    Singulair: Prescription approved per [facility], [facility] or [facility] refill protocol.  Carlie Perez RN - Triage  [facility]

## 2018-05-03 DIAGNOSIS — J45.40 MODERATE PERSISTENT ASTHMA WITHOUT COMPLICATION: ICD-10-CM

## 2018-05-03 NOTE — TELEPHONE ENCOUNTER
30 day supply given.  Patient is due for an OV.  Please call and assist with scheduling appointment prior to next refill   Carlie Perez RN - Triage  [facility]

## 2018-05-03 NOTE — TELEPHONE ENCOUNTER
"Requested Prescriptions   Pending Prescriptions Disp Refills     mometasone-formoterol (DULERA) 200-5 MCG/ACT oral inhaler  Last Written Prescription Date:  10-  Last Fill Quantity: 13 g,  # refills: 5   Last office visit: 5/2/2017 with prescribing provider:  5-2-2017   Future Office Visit:     13 g 5     Sig: INHALE 2 PUFFS BY MOUTH TWICE DAILY    Inhaled Steroids Protocol Failed    5/3/2018  9:12 AM       Failed - Asthma control assessment score within normal limits in last 6 months    Please review ACT score.          Failed - Recent (6 mo) or future (30 days) visit within the authorizing provider's specialty    Patient had office visit in the last 6 months or has a visit in the next 30 days with authorizing provider or within the authorizing provider's specialty.  See \"Patient Info\" tab in inbasket, or \"Choose Columns\" in Meds & Orders section of the refill encounter.           Passed - Patient is age 12 or older          "

## 2018-05-21 DIAGNOSIS — G89.29 CHRONIC BILATERAL LOW BACK PAIN WITHOUT SCIATICA: ICD-10-CM

## 2018-05-21 DIAGNOSIS — M54.50 CHRONIC BILATERAL LOW BACK PAIN WITHOUT SCIATICA: ICD-10-CM

## 2018-05-21 NOTE — TELEPHONE ENCOUNTER
Gabapentin      Last Written Prescription Date: 9/15/2016  Last Fill Quantity: 270,  # refills: 3   Last Office Visit with [facility], [facility] or [facility] prescribing provider: 5/2/2017                                               Routing refill request to provider for review/approval because:  Drug not on the [facility] refill protocol   A break in medication  Patient needs to be seen because it has been more than 1 year since last office visit.  Carlie Perez RN - Triage  [facility]      Non-detailed message left to return our call.  Verify use of medication should have been out 6 months ago and discuss need for appointment.  Carlie Perez RN - Triage  [facility]

## 2018-05-23 NOTE — TELEPHONE ENCOUNTER
Left message on answering machine for patient to call back.  Jodi Zhu,RN BSN  [facility]  [phone]

## 2018-05-24 NOTE — TELEPHONE ENCOUNTER
3rd attempt. Left message for patient to call triage back  Gabriela Reyes RN- Triage [employer]

## 2018-05-29 RX ORDER — GABAPENTIN 300 MG/1
CAPSULE ORAL
Qty: 270 CAPSULE | Refills: 0 | Status: SHIPPED | OUTPATIENT
Start: 2018-05-29

## 2018-05-29 NOTE — TELEPHONE ENCOUNTER
[employer] has not returned triage calls- routing to provider.    Jodi Zhu,RN BSN  [facility]  [phone]

## 2018-07-30 DIAGNOSIS — J45.40 MODERATE PERSISTENT ASTHMA WITHOUT COMPLICATION: ICD-10-CM

## 2018-07-30 NOTE — LETTER
[place] [id number] [place] [address]   [phone]      August 7, 2018    Willow Gerard  [address] [address]            Dear Ms. Gerard    Your physician requires an office visit in order to monitor your maintenance medication(s).  Your last office visit was on 5/2/2017.  We have faxed to the pharmacy a 1 month refill of your medication(s) until you can be seen by your provider.  Please call the clinic at [phone] to schedule an appointment.        Sincerely,    [facility]

## 2018-07-31 RX ORDER — TIOTROPIUM BROMIDE INHALATION SPRAY 3.12 UG/1
SPRAY, METERED RESPIRATORY (INHALATION)
Qty: 4 G | Refills: 0 | Status: SHIPPED | OUTPATIENT
Start: 2018-07-31

## 2018-07-31 NOTE — TELEPHONE ENCOUNTER
"Requested Prescriptions   Pending Prescriptions Disp Refills     SPIRIVA RESPIMAT 2.5 MCG/ACT inhalation aerosol [Pharmacy Med Name: SPIRIVA RESPIMAT 2.5MCG INH 4GM 60D]  Last Written Prescription Date:  1/15/18  Last Fill Quantity: 7,  # refills: 5   Last office visit: 5/2/2017 with prescribing provider:  Adam   Future Office Visit:     4 g 0     Sig: INHALE 2 PUFFS INTO THE LUNGS DAILY    Asthma Maintenance Inhalers - Anticholinergics Failed    7/30/2018  7:48 AM       Failed - Asthma control assessment score within normal limits in last 6 months    Please review ACT score.   ACT Total Scores 9/15/2016 11/22/2016 5/2/2017   ACT TOTAL SCORE - - -   ASTHMA ER VISITS - - -   ASTHMA HOSPITALIZATIONS - - -   ACT TOTAL SCORE (Goal Greater than or Equal to 20) 15 14 20   In the past 12 months, how many times did you visit the emergency room for your asthma without being admitted to the hospital? 0 0 0   In the past 12 months, how many times were you hospitalized overnight because of your asthma? 0 0 0              Failed - Recent (6 mo) or future (30 days) visit within the authorizing provider's specialty    Patient had office visit in the last 6 months or has a visit in the next 30 days with authorizing provider or within the authorizing provider's specialty.  See \"Patient Info\" tab in inbasket, or \"Choose Columns\" in Meds & Orders section of the refill encounter.           Passed - Patient is age 12 years or older          "

## 2018-07-31 NOTE — TELEPHONE ENCOUNTER
30 day supply given.  Patient is due for an OV.  Please call and assist with scheduling appointment prior to next refill   Carlie Perez RN - Triage  [facility]

## 2018-08-23 DIAGNOSIS — G89.29 CHRONIC BILATERAL LOW BACK PAIN WITHOUT SCIATICA: ICD-10-CM

## 2018-08-23 DIAGNOSIS — M54.50 CHRONIC BILATERAL LOW BACK PAIN WITHOUT SCIATICA: ICD-10-CM

## 2018-08-23 RX ORDER — GABAPENTIN 300 MG/1
CAPSULE ORAL
Qty: 270 CAPSULE | Refills: 0 | OUTPATIENT
Start: 2018-08-23

## 2018-08-23 NOTE — LETTER
[facility]  [address] [place] [address]  [phone]        August 24, 2018  Willow Gerard  [address] [address]    Dear Willow,      We have tried to reach you multiple times regarding a refill request. Your chart indicates that you are due for an office visit and in order for us to continue refilling your medication you will need to schedule an appointment with a provider.       Here is a list of Health Maintenance topics that are due now or due soon:  Health Maintenance Due   Topic Date Due     HIV SCREEN (SYSTEM ASSIGNED)  09/18/1992     Wellness Visit with your Primary Provider - yearly  11/18/2015     Asthma Action Plan - yearly  09/15/2017     Asthma Control Test - every 6 months  11/02/2017     Pap Smear - every 3 years  11/18/2017     Depression Assessment 2 - yearly  05/02/2018       Please call us at [phone] (or use [employer]) to address the above recommendations.     Thank you for trusting [facility] and we appreciate the opportunity to serve you.  We look forward to supporting your healthcare needs in the future.    Healthy Regards,    SHERYL [place] [facility]

## 2018-08-23 NOTE — TELEPHONE ENCOUNTER
gabapentin      Last Written Prescription Date:  9/15/16  Last Fill Quantity: 270,   # refills: 3  Last Office Visit: 5/2/17  Future Office visit:       Patient has not been seen in over 1 year  Medication has not been prescribed for 2 years    Attempted to call patient, no answer and voicemail box full. Patient way overdue for OV. Has been given multiple rosa isela refills of meds. Has been called, letters sent and [employer] messages.   Rx denied. Routing to team to send another letter to inform.     Francie Lockett RN   [facility] - Triage

## 2018-08-29 DIAGNOSIS — J45.40 MODERATE PERSISTENT ASTHMA WITHOUT COMPLICATION: ICD-10-CM

## 2018-08-29 NOTE — LETTER
September 18, 2018      Willow Gerard  [address] [address]            Magalie Gerard,    It is requested that you please schedule an appointment to receive additional refills on the medication SPIRIVA RESPIMAT 2.5MCG INH 4GM 60D. There will not be any refills given until you are seen by a provider.    If you have any further questions or would like to schedule an appointment, please contact our office at [phone].    Thank you,    [facility] [place]

## 2018-08-29 NOTE — TELEPHONE ENCOUNTER
"Requested Prescriptions   Pending Prescriptions Disp Refills     SPIRIVA RESPIMAT 2.5 MCG/ACT inhalation aerosol [Pharmacy Med Name: SPIRIVA RESPIMAT 2.5MCG INH 4GM 60D]  Last Written Prescription Date:  7/31/18  Last Fill Quantity: 4,  # refills: 0   Last office visit: 5/2/2017 with prescribing provider:  Adam   Future Office Visit:     4 g 0     Sig: INHALE 2 PUFFS BY MOUTH INTO THE LUNGS DAILY    Asthma Maintenance Inhalers - Anticholinergics Failed    8/29/2018  8:16 AM       Failed - Asthma control assessment score within normal limits in last 6 months    Please review ACT score.   ACT Total Scores 9/15/2016 11/22/2016 5/2/2017   ACT TOTAL SCORE - - -   ASTHMA ER VISITS - - -   ASTHMA HOSPITALIZATIONS - - -   ACT TOTAL SCORE (Goal Greater than or Equal to 20) 15 14 20   In the past 12 months, how many times did you visit the emergency room for your asthma without being admitted to the hospital? 0 0 0   In the past 12 months, how many times were you hospitalized overnight because of your asthma? 0 0 0              Failed - Recent (6 mo) or future (30 days) visit within the authorizing provider's specialty    Patient had office visit in the last 6 months or has a visit in the next 30 days with authorizing provider or within the authorizing provider's specialty.  See \"Patient Info\" tab in inbasket, or \"Choose Columns\" in Meds & Orders section of the refill encounter.           Passed - Patient is age 12 years or older          "

## 2018-08-29 NOTE — TELEPHONE ENCOUNTER
Patient due for OV.  Unable to leave voice mail as it was full  Carlie Perez RN - Triage  [facility]

## 2018-09-06 NOTE — TELEPHONE ENCOUNTER
Attempt # 2 Sent mcyahrt message  Attempt # 3 called home number- no voice mail    Jodi ZhuRN BSN  [facility]  [phone]

## 2018-09-07 NOTE — TELEPHONE ENCOUNTER
Please call her to make an appointment for follow-up.  After she makes an appointment give her one refill only.    Natasha Medina MD  [facility] [place]

## 2018-09-07 NOTE — TELEPHONE ENCOUNTER
Routing refill request to provider for review/approval because:  Payton given x1 and patient did not follow up, please advise  Patient needs to be seen because it has been more than 1 year since last office visit.  Attempts to reach patient unsuccessful  Carlie Perez RN - Triage  [facility]

## 2018-09-10 RX ORDER — TIOTROPIUM BROMIDE INHALATION SPRAY 3.12 UG/1
SPRAY, METERED RESPIRATORY (INHALATION)
Qty: 4 G | Refills: 0 | OUTPATIENT
Start: 2018-09-10

## 2018-09-10 NOTE — TELEPHONE ENCOUNTER
Routing to team to inform and assist in scheduling. Please send back to triage if patient schedules appointment.   Francie Lockett RN   [facility] - Triage

## 2018-09-10 NOTE — TELEPHONE ENCOUNTER
Unable to leave message pt's mailbox is full.    Pt is due for follow up with provider before any further refills.    Chikis Morton CMA

## 2018-10-09 DIAGNOSIS — D50.8 OTHER IRON DEFICIENCY ANEMIA: ICD-10-CM

## 2018-10-09 RX ORDER — FERROUS SULFATE 325(65) MG
325 TABLET ORAL 2 TIMES DAILY
Qty: 180 TABLET | Refills: 3 | OUTPATIENT
Start: 2018-10-09

## 2018-10-09 NOTE — TELEPHONE ENCOUNTER
"Requested Prescriptions   Pending Prescriptions Disp Refills     ferrous sulfate (IRON) 325 (65 Fe) MG tablet  Last Written Prescription Date:  9/13/17  Last Fill Quantity: 180,  # refills: 3   Last office visit: 5/2/2017 with prescribing provider:  Adam   Future Office Visit:     180 tablet 3     Sig: Take 1 tablet (325 mg) by mouth 2 times daily    Iron Supplements Failed    10/9/2018  8:46 AM       Failed - Recent (12 mo) or future (30 days) visit within the authorizing provider's specialty    Patient had office visit in the last 12 months or has a visit in the next 30 days with authorizing provider or within the authorizing provider's specialty.  See \"Patient Info\" tab in inbasket, or \"Choose Columns\" in Meds & Orders section of the refill encounter.           Failed - Hgb OR Hct on record within the past 12 mos.    Patient need only have had a HGB or HCT on file in the past 12 mos. That result does not need to be normal.    Recent Labs   Lab Test  03/17/16   1126  11/18/14   0929   HGB  12.9  10.3*     Recent Labs   Lab Test  03/17/16   1126  11/18/14   0929   HCT  40.3  34.2*       Please verify a HGB or HCT has been checked SINCE THE LAST DOSE CHANGE.           Passed - Patient is 12 years of age or older          "

## 2018-10-09 NOTE — LETTER
October 12, 2018      Willow Gerard  [address] [address]        Dear Willow,         Your provider Natasha Medina MD has requested that you please schedule an appointment to receive additional refills on the medication ferrous sulfate (IRON) 325 (65 Fe) MG tablet. There will not be any refills given until you are seen by a provider.    If you have any further questions or would like to schedule an appointment, please contact our office at [phone].    Thank you,    [facility] [place]

## 2018-10-09 NOTE — TELEPHONE ENCOUNTER
Routing refill request to provider for review/approval because:  Labs not current:  all  Patient needs to be seen because it has been more than 1 year since last office visit.  Patient has been notified with other refills of need for appointment with no follow up.  Carlie Perez RN - Triage  [facility]

## 2018-10-22 DIAGNOSIS — J45.40 MODERATE PERSISTENT ASTHMA WITHOUT COMPLICATION: ICD-10-CM

## 2018-10-22 RX ORDER — MONTELUKAST SODIUM 10 MG/1
10 TABLET ORAL AT BEDTIME
Qty: 30 TABLET | Refills: 0 | Status: SHIPPED | OUTPATIENT
Start: 2018-10-22 | End: 2018-11-21

## 2018-10-22 NOTE — TELEPHONE ENCOUNTER
30 day supply given.  Patient is due for an OV.  Please call and assist with scheduling appointment prior to next refill   Carlie Perez RN - Triage  [facility]

## 2018-10-22 NOTE — TELEPHONE ENCOUNTER
"Requested Prescriptions   Pending Prescriptions Disp Refills     montelukast (SINGULAIR) 10 MG tablet  Last Written Prescription Date:  4-  Last Fill Quantity: 90 tablet,  # refills: 1   Last office visit: 5/2/2017 with prescribing provider:     Future Office Visit:     90 tablet 1     Sig: Take 1 tablet (10 mg) by mouth At Bedtime    Leukotriene Inhibitors Protocol Failed    10/22/2018  9:08 AM       Failed - Asthma control assessment score within normal limits in last 6 months    Please review ACT score.          Failed - Recent (6 mo) or future (30 days) visit within the authorizing provider's specialty    Patient had office visit in the last 6 months or has a visit in the next 30 days with authorizing provider or within the authorizing provider's specialty.  See \"Patient Info\" tab in inbasket, or \"Choose Columns\" in Meds & Orders section of the refill encounter.           Passed - Patient is age 12 or older    If patient is under 16, ok to refill using age based dosing.             "

## 2018-10-22 NOTE — LETTER
October 29, 2018      Willow Gerard  [address] [address]        Dear Willow,     Your montelukast (SINGULAIR) 10 MG tablet was refilled for 30 days  You are due to be seen in clinic by your Provider prior to your next refill   Please contact the clinic and allow enough time to schedule prior to your next refill need.  [phone]      Adela  for :  Dr. Megan Medina

## 2018-11-21 DIAGNOSIS — J45.40 MODERATE PERSISTENT ASTHMA WITHOUT COMPLICATION: ICD-10-CM

## 2018-11-21 RX ORDER — MONTELUKAST SODIUM 10 MG/1
TABLET ORAL
Qty: 30 TABLET | Refills: 0 | Status: SHIPPED | OUTPATIENT
Start: 2018-11-21 | End: 2018-12-20

## 2018-11-21 NOTE — TELEPHONE ENCOUNTER
"Requested Prescriptions   Pending Prescriptions Disp Refills     montelukast (SINGULAIR) 10 MG tablet [Pharmacy Med Name: MONTELUKAST 10MG TABLETS]  Last Written Prescription Date:  10/22/18  Last Fill Quantity: 30,  # refills: 0   Last office visit: 5/2/2017 with prescribing provider:  Adam   Future Office Visit:     30 tablet 0     Sig: TAKE 1 TABLET BY MOUTH AT BEDTIME    Leukotriene Inhibitors Protocol Failed    11/21/2018  3:26 AM       Failed - Asthma control assessment score within normal limits in last 6 months    Please review ACT score.   ACT Total Scores 9/15/2016 11/22/2016 5/2/2017   ACT TOTAL SCORE - - -   ASTHMA ER VISITS - - -   ASTHMA HOSPITALIZATIONS - - -   ACT TOTAL SCORE (Goal Greater than or Equal to 20) 15 14 20   In the past 12 months, how many times did you visit the emergency room for your asthma without being admitted to the hospital? 0 0 0   In the past 12 months, how many times were you hospitalized overnight because of your asthma? 0 0 0              Failed - Recent (6 mo) or future (30 days) visit within the authorizing provider's specialty    Patient had office visit in the last 6 months or has a visit in the next 30 days with authorizing provider or within the authorizing provider's specialty.  See \"Patient Info\" tab in inbasket, or \"Choose Columns\" in Meds & Orders section of the refill encounter.           Passed - Patient is age 12 or older    If patient is under 16, ok to refill using age based dosing.             "

## 2018-11-21 NOTE — TELEPHONE ENCOUNTER
Routing refill request to provider for review/approval because:  Failed act  Jodi Zhu,RN BSN  [facility]  [phone]

## 2019-01-18 DIAGNOSIS — J45.40 MODERATE PERSISTENT ASTHMA WITHOUT COMPLICATION: ICD-10-CM

## 2019-01-18 RX ORDER — MONTELUKAST SODIUM 10 MG/1
TABLET ORAL
Qty: 30 TABLET | Refills: 0 | OUTPATIENT
Start: 2019-01-18

## 2019-01-18 NOTE — LETTER
January 25, 2019      Willow Gerard  [address] [address]          Dear Ms. Gerard,    Your physician requires an office visit every 6 months in order to monitor your maintenance medication(s).  Your last office visit was on 5/2/17.  Please call the clinic at [phone] to schedule an appointment.        Sincerely,    [facility] [place]

## 2019-01-18 NOTE — TELEPHONE ENCOUNTER
"Requested Prescriptions   Pending Prescriptions Disp Refills     montelukast (SINGULAIR) 10 MG tablet [Pharmacy Med Name: MONTELUKAST 10MG TABLETS] 30 tablet 0     Sig: TAKE 1 TABLET BY MOUTH EVERY NIGHT AT BEDTIME    Leukotriene Inhibitors Protocol Failed - 1/18/2019  3:27 AM       Failed - Asthma control assessment score within normal limits in last 6 months    ACT Total Scores 9/15/2016 11/22/2016 5/2/2017   ACT TOTAL SCORE - - -   ASTHMA ER VISITS - - -   ASTHMA HOSPITALIZATIONS - - -   ACT TOTAL SCORE (Goal Greater than or Equal to 20) 15 14 20   In the past 12 months, how many times did you visit the emergency room for your asthma without being admitted to the hospital? 0 0 0   In the past 12 months, how many times were you hospitalized overnight because of your asthma? 0 0 0            Failed - Recent (6 mo) or future (30 days) visit within the authorizing provider's specialty    Patient had office visit in the last 6 months or has a visit in the next 30 days with authorizing provider or within the authorizing provider's specialty.  See \"Patient Info\" tab in inbasket, or \"Choose Columns\" in Meds & Orders section of the refill encounter.       Last Written Prescription Date:  12/21/2018  Last Fill Quantity: 30,  # refills: 0   Last office visit: 5/2/2017 with prescribing provider:  RAZA Medina  Future Office Visit:        Passed - Patient is age 12 or older    If patient is under 16, ok to refill using age based dosing.          Passed - Medication is active on med list          "

## 2019-01-18 NOTE — TELEPHONE ENCOUNTER
Attempted to call pt at [phone] and received message that the mailbox is full and cannot accept messages.  Please hang up.  Pt has not read any my chart messages since 11/2017.    Asmita FIGUEROA RN  EP Triage

## 2019-01-22 NOTE — TELEPHONE ENCOUNTER
Attempt # 3     Called [phone]    Mailbox is full. Unable to leave message.     Routing to team 1 to send patient a letter. Thank you     Maritza PACHECON, RN   [facility]

## 2019-03-15 DIAGNOSIS — D50.8 OTHER IRON DEFICIENCY ANEMIA: ICD-10-CM

## 2019-03-15 RX ORDER — FERROUS SULFATE 325(65) MG
325 TABLET ORAL 2 TIMES DAILY
Qty: 180 TABLET | Refills: 3 | OUTPATIENT
Start: 2019-03-15

## 2019-03-15 NOTE — TELEPHONE ENCOUNTER
"Requested Prescriptions   Pending Prescriptions Disp Refills     ferrous sulfate (FEROSUL) 325 (65 Fe) MG tablet  Last Written Prescription Date:  9-  Last Fill Quantity: 180 tablet,  # refills: 3   Last office visit: 5/2/2017 with prescribing provider:     Future Office Visit:     180 tablet 3     Sig: Take 1 tablet (325 mg) by mouth 2 times daily    Iron Supplements Failed - 3/15/2019 10:24 AM       Failed - Recent (12 mo) or future (30 days) visit within the authorizing provider's specialty    Patient had office visit in the last 12 months or has a visit in the next 30 days with authorizing provider or within the authorizing provider's specialty.  See \"Patient Info\" tab in inbasket, or \"Choose Columns\" in Meds & Orders section of the refill encounter.             Failed - Hgb OR Hct on record within the past 12 mos.    Patient need only have had a HGB or HCT on file in the past 12 mos. That result does not need to be normal.    Recent Labs   Lab Test 03/17/16  1126 11/18/14  0929   HGB 12.9 10.3*     Recent Labs   Lab Test 03/17/16  1126 11/18/14  0929   HCT 40.3 34.2*       Please verify a HGB or HCT has been checked SINCE THE LAST DOSE CHANGE.           Passed - Patient is 12 years of age or older       Passed - Medication is active on med list          "

## 2019-03-15 NOTE — TELEPHONE ENCOUNTER
Routing refill request to provider for review/approval because:  Labs not current:  HGB 12.9 on 3/17/16  LOV 5/2/17.    Mailbox is full and not able to leave a message.    Asmita FIGUEROA RN  EP Triage

## 2019-03-18 NOTE — TELEPHONE ENCOUNTER
Routing to team to inform and assist in scheduling.   Francie Lockett RN   [facility] - Triage

## 2019-03-19 NOTE — TELEPHONE ENCOUNTER
Millicent message sent.      .Lady RAYO    [facility] [place]

## 2019-03-20 NOTE — TELEPHONE ENCOUNTER
TC will mail letter to patient.      .Lady RAYO    [facility]

## 2020-03-10 ENCOUNTER — HEALTH MAINTENANCE LETTER (OUTPATIENT)
Age: 45
End: 2020-03-10

## 2020-12-20 ENCOUNTER — HEALTH MAINTENANCE LETTER (OUTPATIENT)
Age: 45
End: 2020-12-20

## 2021-02-28 ENCOUNTER — HEALTH MAINTENANCE LETTER (OUTPATIENT)
Age: 46
End: 2021-02-28

## 2021-04-24 ENCOUNTER — HEALTH MAINTENANCE LETTER (OUTPATIENT)
Age: 46
End: 2021-04-24

## 2021-10-03 ENCOUNTER — HEALTH MAINTENANCE LETTER (OUTPATIENT)
Age: 46
End: 2021-10-03

## 2022-03-20 ENCOUNTER — HEALTH MAINTENANCE LETTER (OUTPATIENT)
Age: 47
End: 2022-03-20

## 2022-05-15 ENCOUNTER — HEALTH MAINTENANCE LETTER (OUTPATIENT)
Age: 47
End: 2022-05-15

## 2022-09-11 ENCOUNTER — HEALTH MAINTENANCE LETTER (OUTPATIENT)
Age: 47
End: 2022-09-11

## 2023-04-30 ENCOUNTER — HEALTH MAINTENANCE LETTER (OUTPATIENT)
Age: 48
End: 2023-04-30

## 2023-06-03 ENCOUNTER — HEALTH MAINTENANCE LETTER (OUTPATIENT)
Age: 48
End: 2023-06-03

## 2024-08-26 NOTE — NURSING NOTE
"Chief Complaint   Patient presents with     Shoulder Pain     Forms       Initial /86 (BP Location: Right arm, Cuff Size: Adult Large)  Pulse 110  Temp 98.2  F (36.8  C) (Tympanic)  Ht 5' 1\" (1.549 m)  Wt 269 lb 6.4 oz (122.2 kg)  SpO2 98%  BMI 50.9 kg/m2 Estimated body mass index is 50.9 kg/(m^2) as calculated from the following:    Height as of this encounter: 5' 1\" (1.549 m).    Weight as of this encounter: 269 lb 6.4 oz (122.2 kg).  Medication Reconciliation: complete  "
3
